# Patient Record
Sex: MALE | URBAN - NONMETROPOLITAN AREA
[De-identification: names, ages, dates, MRNs, and addresses within clinical notes are randomized per-mention and may not be internally consistent; named-entity substitution may affect disease eponyms.]

---

## 2021-06-15 ENCOUNTER — APPOINTMENT (RX ONLY)
Dept: URBAN - NONMETROPOLITAN AREA CLINIC 9 | Facility: CLINIC | Age: 61
Setting detail: DERMATOLOGY
End: 2021-06-15

## 2021-06-15 VITALS — DIASTOLIC BLOOD PRESSURE: 76 MMHG | SYSTOLIC BLOOD PRESSURE: 124 MMHG

## 2021-06-15 DIAGNOSIS — B07.8 OTHER VIRAL WARTS: ICD-10-CM

## 2021-06-15 DIAGNOSIS — L40.0 PSORIASIS VULGARIS: ICD-10-CM

## 2021-06-15 PROCEDURE — ? COUNSELING

## 2021-06-15 PROCEDURE — ? IN-HOUSE DISPENSING PHARMACY

## 2021-06-15 PROCEDURE — ? CPT CODE GENERATOR

## 2021-06-15 PROCEDURE — ? INVENTORY

## 2021-06-15 PROCEDURE — 99202 OFFICE O/P NEW SF 15 MIN: CPT

## 2021-06-15 PROCEDURE — ? DEFER

## 2021-06-15 PROCEDURE — ? TREATMENT REGIMEN

## 2021-06-15 PROCEDURE — ? PRESCRIPTION

## 2021-06-15 RX ORDER — CEPHALEXIN 500 MG/1
CAPSULE ORAL
Qty: 2 | Refills: 0 | Status: ERX

## 2021-06-15 ASSESSMENT — LOCATION SIMPLE DESCRIPTION DERM
LOCATION SIMPLE: LEFT ELBOW
LOCATION SIMPLE: LEFT FOREARM

## 2021-06-15 ASSESSMENT — LOCATION DETAILED DESCRIPTION DERM
LOCATION DETAILED: LEFT ELBOW
LOCATION DETAILED: LEFT PROXIMAL DORSAL FOREARM

## 2021-06-15 ASSESSMENT — LOCATION ZONE DERM: LOCATION ZONE: ARM

## 2021-06-15 NOTE — PROCEDURE: CPT CODE GENERATOR
Stelara (J-Code: ) Price: 0.00
Show J-Codes?: No
J Code Units: 1
Anticipated Depth And Size Of Soft Tissue Excision (Required): Subcutaneous, Less than 2 cm
Anticipated Excised Diameter (Required): 1.1 - 2.0 cm
Anticipated Depth And Size Of Soft Tissue Excision (Required): Subcutaneous, Less than 3 cm
Anticipated Depth And Size Of Soft Tissue Excision (Required): Subcutaneous, Less than 1.5 cm
Detail Level: None
Which Photosensitizer Was Used?: Levulan
Fee Schedule Discount In % Off Of Fee Schedule: Standard Fee Schedule as Entered in Pricing Tab
Number Of Lesions Injected: Less than 8 lesions
First Procedure You Would Like A Quote For?: Benign Excision
Show Excision Codes?: Yes
Fee Schedule Discount For Cash Pay Patients In % Off Of Fee Schedule: 0
[id number] Price: 262
First Biopsy Type: Tangential Biopsy
Location (Required): Extremities
How Many Lesions Are You Destroying?: Less than 15

## 2021-06-15 NOTE — HPI: RASH
What Type Of Note Output Would You Prefer (Optional)?: Standard Output
Is This A New Presentation, Or A Follow-Up?: Rash
Additional History: He has a topical that he applies once a day 6 days a week, but the rash never clears.

## 2021-06-15 NOTE — PROCEDURE: IN-HOUSE DISPENSING PHARMACY
Product 26 Application Directions: Wash the affected areas daily or as directed.
Product 18 Price/Unit (In Dollars): 0
Product 1 Amount/Unit (Numbers Only): 30
Name Of Product 31: Wart Corn and Callus Cream\\n(Cimetidine 10%, Lidocaine 5%, Salicylic Acid 40%)
Name Of Product 22: Dermatitis Cream\\n(Desoximetasone 0.05%, Niacinamide 4%)
Product 6 Unit Type: mg
Product 26 Unit Type: grams
Product 11 Application Directions: Apply sparingly topically to the affected areas 1-2 times daily as directed.
Product 24 Amount/Unit (Numbers Only): 60
Product 22 Application Directions: Apply sparingly topically to the affected areas 1-2 times a day M-F, taper as directed.
Product 41 Price/Unit (In Dollars): 50
Name Of Product 21: Dermatitis Cream\\n(Fluocinolone Acetonide 0.01%, Niacinamide 4%)
Detail Level: Zone
Product 31 Application Directions: Apply topically to the affected areas 1-2 times daily as directed.
Name Of Product 27: Dermatitis Foam\\n(Clobetasol Propionate 0.05%, Calcipotriene 0.005%)
Name Of Product 12: Anti-Fungal Nail Solution\\n(Fluconazole 4%, Ibuprofen 2%, Itraconazole 1%, Terbinafine HCL 4%)
Send Charges To Patient Encounter: No
Product 2 Amount/Unit (Numbers Only): 120
Name Of Product 32: Actinic Keratosis Gel / Wart Gel\\n(Imiquimod 5%, Niacinamide 2%, Levocetirizine Dihydrochloride 1%)
Name Of Product 23: Dermatitis Cream\\n(Clobetasol Propionate 0.05%, Niacinamide 4%)
Product 25 Application Directions: Patient is to mix with 14 ounces of Skin MD.\\nApply sparingly topically to the affected areas 5-10 times per week, taper as directed.
Product 12 Amount/Unit (Numbers Only): 15
Name Of Product 3: Rosacea Silicone Gel\\n(Metronidazole 1%, Ivermectin 1%, Potassium Azeloyl Diglycinate 5%, Niacinamide 4%)
Name Of Product 26: Seborrheic Dermatitis Shampoo\\n(Ciclopirox 0.77%, Salicylic Acid 3%, Zinc Pyrithione 0.2%, Clobetasol Propionate 0.05%)
Name Of Product 11: Anti-Fungal Cream\\n(Ciclopirox 3%, Itraconazole 5%, Urea 20%, DMSO 5%)
Name Of Product 41: Alopecia Topical Solution for Men HP\\n(Minoxidil 7%, Finasteride 0.1%)
Product 24 Application Directions: Apply sparingly topically to the affected areas 3 times per week, taper as directed.
Name Of Product 2: Acne Body Wash\\n(Salicylic Acid 5%, Sodium Sulfacetamide Monohydrate 10%, Sulfur Precipitated 2.75%)
Name Of Product 25: Dermatitis Topical Solution\\n(Clobetasol Propionate 0.05%, Niacinamide 4%)
Product 2 Application Directions: Wash affected areas daily or as directed.
Product 41 Application Directions: Apply topically to the affected areas 1 or 2 times daily or as directed.
Product 27 Application Directions: Apply sparingly topically to the affected areas 1-2 times daily, taper as directed.
Product 12 Application Directions: Apply topically to the affected nail(s) and surrounding areas twice daily as directed.
Product 23 Units Dispensed: 1
Render Refills If Set To 0: Yes
Product 23 Application Directions: Apply sparingly topically to the affected areas 1 time a day M-F, taper as directed.
Product 32 Application Directions: Apply sparingly topically to the affected areas daily as directed.
Name Of Product 28: Psoriasis Cream\\n(Calcipotriene 0.005%, Niacinamide 4%)
Name Of Product 1: Acne Gel\\n(Adapalene 0.3%, Benzoyl Peroxide 2.5%, Niacinamide 4%)
Name Of Product 24: Dermatitis Topical Solution\\n(Clobetasol Propionate 0.05%, Niacinamide 4%
Product 1 Application Directions: Apply sparingly topically to acne affected zones daily as directed.
Product 28 Application Directions: Apply sparingly to all affected areas as directed 1-2 times per day.

## 2021-06-15 NOTE — HPI: SKIN LESION
What Type Of Note Output Would You Prefer (Optional)?: Standard Output
Has Your Skin Lesion Been Treated?: not been treated
Is This A New Presentation, Or A Follow-Up?: Growth
Additional History: He would like it removed.

## 2021-06-15 NOTE — PROCEDURE: DEFER
Instructions (Optional): Growing, traumatized
Procedure To Be Performed At Next Visit: Excision
Introduction Text (Please End With A Colon): The following procedure was deferred:
Detail Level: Detailed

## 2021-06-16 ENCOUNTER — APPOINTMENT (RX ONLY)
Dept: URBAN - NONMETROPOLITAN AREA CLINIC 9 | Facility: CLINIC | Age: 61
Setting detail: DERMATOLOGY
End: 2021-06-16

## 2021-06-16 DIAGNOSIS — B07.8 OTHER VIRAL WARTS: ICD-10-CM

## 2021-06-16 PROCEDURE — 11402 EXC TR-EXT B9+MARG 1.1-2 CM: CPT

## 2021-06-16 PROCEDURE — ? EXCISION (NO PATHOLOGY)

## 2021-06-16 ASSESSMENT — LOCATION DETAILED DESCRIPTION DERM: LOCATION DETAILED: LEFT ELBOW

## 2021-06-16 ASSESSMENT — LOCATION SIMPLE DESCRIPTION DERM: LOCATION SIMPLE: LEFT ELBOW

## 2021-06-16 ASSESSMENT — LOCATION ZONE DERM: LOCATION ZONE: ARM

## 2021-06-16 NOTE — PROCEDURE: EXCISION (NO PATHOLOGY)
Medical Necessity Information: It is in your best interest to select a reason for this procedure from the list below. All of these items fulfill various CMS LCD requirements except lesion extends to a margin.
Include Z78.9 (Other Specified Conditions Influencing Health Status) As An Associated Diagnosis?: No
Medical Necessity Clause: This procedure was medically necessary because the lesion that was treated was: growing,
Size Of Lesion In Cm: 1.2
X Size Of Lesion In Cm (Optional): 0
Size Of Margin In Cm: 0.2
Excision Method: Saucerization
Anesthesia Volume In Cc: 2
Repair Type: None - Secondary Intention
Undermining Type: Entire Wound
Debridement Text: The wound edges were debrided prior to proceeding with the closure to facilitate wound healing.
Helical Rim Text: The closure involved the helical rim.
Vermilion Border Text: The closure involved the vermilion border.
Nostril Rim Text: The closure involved the nostril rim.
Retention Suture Text: Retention sutures were placed to support the closure and prevent dehiscence.
Detail Level: Detailed
Excision Depth: adipose tissue
Scalpel Size: 15 blade
Anesthesia Type: 1% lidocaine with epinephrine and a 1:10 solution of 8.4% sodium bicarbonate
Additional Anesthesia Volume In Cc: 6
Hemostasis: Aluminum Chloride
Estimated Blood Loss (Cc): minimal
Anesthesia Type: 1% lidocaine with epinephrine
Deep Sutures: 3-0 Vicryl
Epidermal Closure: running
Wound Care: Petrolatum
dressing with hypoallergenic tape
Complex Repair Preamble Text (Leave Blank If You Do Not Want): Extensive wide undermining was performed.
Intermediate Repair Preamble Text (Leave Blank If You Do Not Want): Undermining was performed with blunt dissection.
Fusiform Excision Additional Text (Leave Blank If You Do Not Want): The margin was drawn around the clinically apparent lesion.  A fusiform shape was then drawn on the skin incorporating the lesion and margins.  Incisions were then made along these lines to the appropriate tissue plane and the lesion was extirpated.
Eliptical Excision Additional Text (Leave Blank If You Do Not Want): The margin was drawn around the clinically apparent lesion.  An elliptical shape was then drawn on the skin incorporating the lesion and margins.  Incisions were then made along these lines to the appropriate tissue plane and the lesion was extirpated.
Saucerization Excision Additional Text (Leave Blank If You Do Not Want): The margin was drawn around the clinically apparent lesion.  Incisions were then made along these lines, in a tangential fashion, to the appropriate tissue plane and the lesion was extirpated.
Slit Excision Additional Text (Leave Blank If You Do Not Want): A linear line was drawn on the skin overlying the lesion. An incision was made slowly until the lesion was visualized.  Once visualized, the lesion was removed with blunt dissection.
Consent was obtained from the patient. The risks and benefits to therapy were discussed in detail. Specifically, the risks of infection, scarring, bleeding, prolonged wound healing, incomplete removal, allergy to anesthesia, nerve injury and recurrence were addressed. Prior to the procedure, the treatment site was clearly identified and confirmed by the patient. All components of Universal Protocol/PAUSE Rule completed.
Render Post-Care Instructions In Note?: yes
Post-Care Instructions: I reviewed with the patient in detail post-care instructions. Patient is to cleanse the area twice daily with hydrogen peroxide and/or diluted vinegar, then apply Vaseline and coverage with bandage.

## 2021-06-29 ENCOUNTER — APPOINTMENT (RX ONLY)
Dept: URBAN - NONMETROPOLITAN AREA CLINIC 9 | Facility: CLINIC | Age: 61
Setting detail: DERMATOLOGY
End: 2021-06-29

## 2021-06-29 DIAGNOSIS — B07.8 OTHER VIRAL WARTS: ICD-10-CM | Status: RESOLVED

## 2021-06-29 DIAGNOSIS — L40.0 PSORIASIS VULGARIS: ICD-10-CM

## 2021-06-29 PROCEDURE — 99213 OFFICE O/P EST LOW 20 MIN: CPT

## 2021-06-29 PROCEDURE — ? TREATMENT REGIMEN

## 2021-06-29 PROCEDURE — ? COUNSELING

## 2021-06-29 ASSESSMENT — LOCATION SIMPLE DESCRIPTION DERM
LOCATION SIMPLE: LEFT KNEE
LOCATION SIMPLE: LEFT ELBOW

## 2021-06-29 ASSESSMENT — LOCATION ZONE DERM
LOCATION ZONE: LEG
LOCATION ZONE: ARM

## 2021-06-29 ASSESSMENT — LOCATION DETAILED DESCRIPTION DERM
LOCATION DETAILED: LEFT KNEE
LOCATION DETAILED: LEFT ELBOW

## 2021-08-31 ENCOUNTER — APPOINTMENT (RX ONLY)
Dept: URBAN - NONMETROPOLITAN AREA CLINIC 9 | Facility: CLINIC | Age: 61
Setting detail: DERMATOLOGY
End: 2021-08-31

## 2021-08-31 DIAGNOSIS — L40.0 PSORIASIS VULGARIS: ICD-10-CM

## 2021-08-31 PROCEDURE — 99213 OFFICE O/P EST LOW 20 MIN: CPT

## 2021-08-31 PROCEDURE — ? IN-HOUSE DISPENSING PHARMACY

## 2021-08-31 PROCEDURE — ? TREATMENT REGIMEN

## 2021-08-31 PROCEDURE — ? INVENTORY

## 2021-08-31 PROCEDURE — ? COUNSELING

## 2021-08-31 ASSESSMENT — LOCATION DETAILED DESCRIPTION DERM
LOCATION DETAILED: LEFT ELBOW
LOCATION DETAILED: LEFT KNEE

## 2021-08-31 ASSESSMENT — LOCATION SIMPLE DESCRIPTION DERM
LOCATION SIMPLE: LEFT KNEE
LOCATION SIMPLE: LEFT ELBOW

## 2021-08-31 ASSESSMENT — LOCATION ZONE DERM
LOCATION ZONE: LEG
LOCATION ZONE: ARM

## 2021-08-31 NOTE — PROCEDURE: IN-HOUSE DISPENSING PHARMACY
Product 22 Refills: 0
Product 26 Amount/Unit (Numbers Only): 120
Product 12 Unit Type: grams
Product 39 Unit Type: mg
Product 1 Amount/Unit (Numbers Only): 30
Product 27 Jj/Unit (In Dollars): 50
Name Of Product 25: Dermatitis Topical Solution\\n(Clobetasol Propionate 0.05%, Niacinamide 4%)
Detail Level: Zone
Product 31 Application Directions: Apply topically to the affected areas 1-2 times daily as directed.
Name Of Product 21: Dermatitis Cream\\n(Fluocinolone Acetonide 0.01%, Niacinamide 4%)
Product 27 Amount/Unit (Numbers Only): 60
Product 23 Application Directions: Apply sparingly topically to the affected areas 1-2 times a day M-F, taper as directed.
Name Of Product 28: Psoriasis Cream\\n(Calcipotriene 0.005%, Niacinamide 4%)
Product 28 Units Dispensed: 1
Name Of Product 12: Anti-Fungal Nail Solution\\n(Fluconazole 4%, Ibuprofen 2%, Itraconazole 1%, Terbinafine HCL 4%)
Name Of Product 24: Dermatitis Topical Solution\\n(Clobetasol Propionate 0.05%, Niacinamide 4%
Name Of Product 3: Rosacea Silicone Gel\\n(Metronidazole 1%, Ivermectin 1%, Potassium Azeloyl Diglycinate 5%, Niacinamide 4%)
Product 28 Application Directions: Apply sparingly to all affected areas as directed 2 times per day.
Product 12 Application Directions: Apply topically to the affected nail(s) and surrounding areas twice daily as directed.
Product 3 Application Directions: Apply sparingly topically to the affected areas 1-2 times daily as directed.
Product 24 Application Directions: Apply sparingly topically to the affected areas 3 times per week, taper as directed.
Product 1 Application Directions: Apply sparingly topically to acne affected zones daily as directed.
Name Of Product 27: Dermatitis Foam\\n(Clobetasol Propionate 0.05%, Calcipotriene 0.005%)
Name Of Product 11: Anti-Fungal Cream\\n(Ciclopirox 3%, Itraconazole 5%, Urea 20%, DMSO 5%)
Name Of Product 23: Dermatitis Cream\\n(Clobetasol Propionate 0.05%, Niacinamide 4%)
Name Of Product 2: Acne Body Wash\\n(Salicylic Acid 5%, Sodium Sulfacetamide Monohydrate 10%, Sulfur Precipitated 2.75%)
Product 27 Application Directions: Apply sparingly topically to the affected areas 1-2 times daily, taper as directed.
Product 2 Application Directions: Wash affected areas daily or as directed.
Send Charges To Patient Encounter: No
Product 25 Application Directions: Patient is to mix with 14 ounces of Skin MD.\\nApply sparingly topically to the affected areas 5-10 times per week, taper as directed.
Name Of Product 41: Alopecia Topical Solution for Men HP\\n(Minoxidil 7%, Finasteride 0.1%)
Name Of Product 32: Actinic Keratosis Gel / Wart Gel\\n(Imiquimod 5%, Niacinamide 2%, Levocetirizine Dihydrochloride 1%)
Name Of Product 26: Seborrheic Dermatitis Shampoo\\n(Ciclopirox 0.77%, Salicylic Acid 3%, Zinc Pyrithione 0.2%, Clobetasol Propionate 0.05%)
Render Refills If Set To 0: Yes
Product 41 Application Directions: Apply topically to the affected areas 1 or 2 times daily or as directed.
Product 32 Application Directions: Apply sparingly topically to the affected areas daily as directed.
Name Of Product 1: Acne Gel\\n(Adapalene 0.3%, Benzoyl Peroxide 2.5%, Niacinamide 4%)
Name Of Product 22: Dermatitis Cream\\n(Desoximetasone 0.05%, Niacinamide 4%)
Name Of Product 31: Wart Corn and Callus Cream\\n(Cimetidine 10%, Lidocaine 5%, Salicylic Acid 40%)
Product 12 Amount/Unit (Numbers Only): 15
Product 26 Application Directions: Wash the affected areas daily or as directed.

## 2024-03-19 ENCOUNTER — APPOINTMENT (RX ONLY)
Dept: URBAN - NONMETROPOLITAN AREA CLINIC 9 | Facility: CLINIC | Age: 64
Setting detail: DERMATOLOGY
End: 2024-03-19

## 2024-03-19 DIAGNOSIS — L82.0 INFLAMED SEBORRHEIC KERATOSIS: ICD-10-CM

## 2024-03-19 DIAGNOSIS — L57.8 OTHER SKIN CHANGES DUE TO CHRONIC EXPOSURE TO NONIONIZING RADIATION: ICD-10-CM

## 2024-03-19 DIAGNOSIS — D485 NEOPLASM OF UNCERTAIN BEHAVIOR OF SKIN: ICD-10-CM

## 2024-03-19 DIAGNOSIS — L40.0 PSORIASIS VULGARIS: ICD-10-CM

## 2024-03-19 PROBLEM — D48.5 NEOPLASM OF UNCERTAIN BEHAVIOR OF SKIN: Status: ACTIVE | Noted: 2024-03-19

## 2024-03-19 PROCEDURE — ? COUNSELING

## 2024-03-19 PROCEDURE — ? DEFER

## 2024-03-19 PROCEDURE — ? TREATMENT REGIMEN

## 2024-03-19 PROCEDURE — 17110 DESTRUCTION B9 LES UP TO 14: CPT

## 2024-03-19 PROCEDURE — ? BENIGN DESTRUCTION

## 2024-03-19 PROCEDURE — ? PRESCRIPTION

## 2024-03-19 PROCEDURE — 99213 OFFICE O/P EST LOW 20 MIN: CPT | Mod: 25

## 2024-03-19 RX ORDER — CEPHALEXIN 500 MG/1
CAPSULE ORAL
Qty: 2 | Refills: 0 | Status: ERX

## 2024-03-19 ASSESSMENT — LOCATION DETAILED DESCRIPTION DERM
LOCATION DETAILED: STERNUM
LOCATION DETAILED: INFERIOR THORACIC SPINE
LOCATION DETAILED: LEFT SUPERIOR MEDIAL MIDBACK
LOCATION DETAILED: LEFT ELBOW
LOCATION DETAILED: RIGHT INFERIOR MEDIAL UPPER BACK
LOCATION DETAILED: RIGHT SUPERIOR MEDIAL UPPER BACK
LOCATION DETAILED: RIGHT MID-UPPER BACK
LOCATION DETAILED: RIGHT SUPERIOR MEDIAL MIDBACK
LOCATION DETAILED: LEFT INFERIOR UPPER BACK
LOCATION DETAILED: RIGHT MEDIAL UPPER BACK
LOCATION DETAILED: RIGHT INFERIOR MEDIAL MIDBACK
LOCATION DETAILED: LEFT MEDIAL INFERIOR CHEST

## 2024-03-19 ASSESSMENT — LOCATION SIMPLE DESCRIPTION DERM
LOCATION SIMPLE: CHEST
LOCATION SIMPLE: LEFT LOWER BACK
LOCATION SIMPLE: LEFT ELBOW
LOCATION SIMPLE: LEFT UPPER BACK
LOCATION SIMPLE: RIGHT UPPER BACK
LOCATION SIMPLE: RIGHT LOWER BACK
LOCATION SIMPLE: UPPER BACK

## 2024-03-19 ASSESSMENT — LOCATION ZONE DERM
LOCATION ZONE: ARM
LOCATION ZONE: TRUNK

## 2024-03-19 NOTE — PROCEDURE: BENIGN DESTRUCTION
Add 52 Modifier (Optional): no
Detail Level: Detailed
Consent: The patient's consent was obtained including but not limited to risks of crusting, scabbing, blistering, scarring, darker or lighter pigmentary change, recurrence, incomplete removal and infection.
Post-Care Instructions: I reviewed with the patient in detail post-care instructions. Patient is to wear sunprotection, and avoid picking at any of the treated lesions. Pt may apply Vaseline to crusted or scabbing areas.
Medical Necessity Clause: This procedure was medically necessary because the lesions that were treated were:
Anesthesia Volume In Cc: 0.5
Treatment Number (Will Not Render If 0): 0
Medical Necessity Information: It is in your best interest to select a reason for this procedure from the list below. All of these items fulfill various CMS LCD requirements except the new and changing color options.

## 2024-04-10 ENCOUNTER — APPOINTMENT (RX ONLY)
Dept: URBAN - NONMETROPOLITAN AREA CLINIC 9 | Facility: CLINIC | Age: 64
Setting detail: DERMATOLOGY
End: 2024-04-10

## 2024-04-10 DIAGNOSIS — D485 NEOPLASM OF UNCERTAIN BEHAVIOR OF SKIN: ICD-10-CM

## 2024-04-10 PROBLEM — D48.5 NEOPLASM OF UNCERTAIN BEHAVIOR OF SKIN: Status: ACTIVE | Noted: 2024-04-10

## 2024-04-10 PROCEDURE — ? PUNCH EXCISION

## 2024-04-10 PROCEDURE — 11400 EXC TR-EXT B9+MARG 0.5 CM<: CPT

## 2024-04-10 ASSESSMENT — LOCATION SIMPLE DESCRIPTION DERM: LOCATION SIMPLE: LEFT UPPER BACK

## 2024-04-10 ASSESSMENT — LOCATION ZONE DERM: LOCATION ZONE: TRUNK

## 2024-04-10 ASSESSMENT — LOCATION DETAILED DESCRIPTION DERM: LOCATION DETAILED: LEFT INFERIOR UPPER BACK

## 2024-04-16 ENCOUNTER — APPOINTMENT (RX ONLY)
Dept: URBAN - NONMETROPOLITAN AREA CLINIC 9 | Facility: CLINIC | Age: 64
Setting detail: DERMATOLOGY
End: 2024-04-16

## 2024-04-16 DIAGNOSIS — Z48.817 ENCOUNTER FOR SURGICAL AFTERCARE FOLLOWING SURGERY ON THE SKIN AND SUBCUTANEOUS TISSUE: ICD-10-CM

## 2024-04-16 PROCEDURE — 99024 POSTOP FOLLOW-UP VISIT: CPT

## 2024-04-16 PROCEDURE — ? POST-OP WOUND EVALUATION

## 2024-04-16 ASSESSMENT — LOCATION SIMPLE DESCRIPTION DERM: LOCATION SIMPLE: LEFT UPPER BACK

## 2024-04-16 ASSESSMENT — LOCATION ZONE DERM: LOCATION ZONE: TRUNK

## 2024-04-16 ASSESSMENT — LOCATION DETAILED DESCRIPTION DERM: LOCATION DETAILED: LEFT INFERIOR UPPER BACK

## 2024-04-16 NOTE — PROCEDURE: POST-OP WOUND EVALUATION
Detail Level: Detailed
Add 68411 Cpt? (Important Note: In 2017 The Use Of 51849 Is Being Tracked By Cms To Determine Future Global Period Reimbursement For Global Periods): yes
Quality 357: Surgical Site Infection (Ssi): No surgical site infection
Wound Diameter In Cm(Optional): 0